# Patient Record
Sex: MALE
[De-identification: names, ages, dates, MRNs, and addresses within clinical notes are randomized per-mention and may not be internally consistent; named-entity substitution may affect disease eponyms.]

---

## 2018-01-01 PROBLEM — Z00.00 ENCOUNTER FOR PREVENTIVE HEALTH EXAMINATION: Status: ACTIVE | Noted: 2018-01-01

## 2018-01-31 ENCOUNTER — APPOINTMENT (OUTPATIENT)
Dept: UROLOGY | Facility: CLINIC | Age: 48
End: 2018-01-31

## 2021-03-01 ENCOUNTER — APPOINTMENT (OUTPATIENT)
Dept: SURGERY | Facility: CLINIC | Age: 51
End: 2021-03-01
Payer: COMMERCIAL

## 2021-03-01 VITALS
SYSTOLIC BLOOD PRESSURE: 134 MMHG | WEIGHT: 200 LBS | HEART RATE: 83 BPM | DIASTOLIC BLOOD PRESSURE: 82 MMHG | HEIGHT: 66 IN | TEMPERATURE: 97.4 F | BODY MASS INDEX: 32.14 KG/M2

## 2021-03-01 DIAGNOSIS — K57.32 DIVERTICULITIS OF LARGE INTESTINE W/OUT PERFORATION OR ABSCESS W/OUT BLEEDING: ICD-10-CM

## 2021-03-01 DIAGNOSIS — Z78.9 OTHER SPECIFIED HEALTH STATUS: ICD-10-CM

## 2021-03-01 DIAGNOSIS — Z83.3 FAMILY HISTORY OF DIABETES MELLITUS: ICD-10-CM

## 2021-03-01 DIAGNOSIS — F17.200 NICOTINE DEPENDENCE, UNSPECIFIED, UNCOMPLICATED: ICD-10-CM

## 2021-03-01 DIAGNOSIS — K64.9 UNSPECIFIED HEMORRHOIDS: ICD-10-CM

## 2021-03-01 DIAGNOSIS — L29.0 PRURITUS ANI: ICD-10-CM

## 2021-03-01 PROCEDURE — 46600 DIAGNOSTIC ANOSCOPY SPX: CPT

## 2021-03-01 PROCEDURE — 99204 OFFICE O/P NEW MOD 45 MIN: CPT | Mod: 25

## 2021-03-01 PROCEDURE — 99072 ADDL SUPL MATRL&STAF TM PHE: CPT

## 2021-03-07 PROBLEM — K57.32 DIVERTICULITIS, COLON: Status: ACTIVE | Noted: 2021-03-01

## 2021-03-07 PROBLEM — Z78.9 CAFFEINE USE: Status: ACTIVE | Noted: 2021-03-01

## 2021-03-07 PROBLEM — L29.0 PRURITUS ANI: Status: ACTIVE | Noted: 2021-03-07

## 2021-03-07 PROBLEM — Z83.3 FAMILY HISTORY OF TYPE 2 DIABETES MELLITUS: Status: ACTIVE | Noted: 2021-03-01

## 2021-03-07 PROBLEM — F17.200 CURRENT EVERY DAY SMOKER: Status: ACTIVE | Noted: 2021-03-01

## 2021-03-07 PROBLEM — K64.9 HEMORRHOID: Status: ACTIVE | Noted: 2021-03-01

## 2021-03-07 PROBLEM — Z78.9 SOCIAL ALCOHOL USE: Status: ACTIVE | Noted: 2021-03-01

## 2021-03-07 RX ORDER — LORATADINE 5 MG
TABLET,CHEWABLE ORAL
Refills: 0 | Status: ACTIVE | COMMUNITY

## 2021-03-07 NOTE — PROCEDURE
[FreeTextEntry1] : JAI and anoscopy show normal sphincter tone, normal internal hemorrhoids and no masses.\par

## 2021-03-07 NOTE — CONSULT LETTER
[Dear  ___] : Dear  [unfilled], [Consult Letter:] : I had the pleasure of evaluating your patient, [unfilled]. [Please see my note below.] : Please see my note below. [Consult Closing:] : Thank you very much for allowing me to participate in the care of this patient.  If you have any questions, please do not hesitate to contact me. [FreeTextEntry3] : Sincerely,\par \par Gelacio Rodriguez MD, Colon and Rectal Surgery\par \par [employer] [facility]\par [address] [place] [address]\par [facility], 3rd Floor\par [place], [place] [address]\par Tel [phone] ext [phone]\par Fax [phone]\par

## 2021-03-07 NOTE — HISTORY OF PRESENT ILLNESS
[FreeTextEntry1] : Patient is a 50M with PMH of nephrolithiasis, constipation, diverticulitis who presents for evaluation of perianal itching and burning.  Patient states that over the last year he has had itching and burning mostly after BM.  He currently has daily BM with Miralax.  Patient denies fevers, chills, nausea, vomiting, abdominal pain, constipation, diarrhea, blood in his stool or unexpected weight loss.  Patient denies a family history of colon cancer rectal cancer or inflammatory bowel disease.  Patient 's last colonoscopy was 3-4 years ago and showed diverticulosis.  We do not have those results.

## 2021-03-07 NOTE — ASSESSMENT
[FreeTextEntry1] : 50M with pruritus ani\par \par I discussed the pathology of pruritus ani with the patient.  I recommended cleaning with only water and a damp cloth, no soap or other topical agents on the area and calmoseptine as a barrier cream.  We will reassess the patient in 2 month.\par

## 2021-03-07 NOTE — PHYSICAL EXAM
[Abdomen Masses] : No abdominal masses [Abdomen Tenderness] : ~T No ~M abdominal tenderness [No HSM] : no hepatosplenomegaly [Excoriation] : no perianal excoriation [Fistula] : no fistulas [Wart] : no warts [Ulcer ___ cm] : no ulcers [Pilonidal Cyst] : no pilonidal cysts [Tender, Swollen] : nontender, non-swollen [Thrombosed] : that was not thrombosed [Skin Tags] : there were no residual hemorrhoidal skin tags seen [Normal] : was normal [None] : there was no rectal mass  [Respiratory Effort] : normal respiratory effort [Normal Rate and Rhythm] : normal rate and rhythm [de-identified] : external examination shows irritation of the perianal skin.  No other sugnificant abnormalities. [de-identified] : awake, alert and in no acute distress

## 2021-03-22 ENCOUNTER — APPOINTMENT (OUTPATIENT)
Dept: PULMONOLOGY | Facility: CLINIC | Age: 51
End: 2021-03-22
Payer: COMMERCIAL

## 2021-03-22 VITALS
RESPIRATION RATE: 14 BRPM | HEART RATE: 108 BPM | DIASTOLIC BLOOD PRESSURE: 80 MMHG | WEIGHT: 200 LBS | HEIGHT: 60 IN | BODY MASS INDEX: 39.27 KG/M2 | OXYGEN SATURATION: 97 % | SYSTOLIC BLOOD PRESSURE: 130 MMHG

## 2021-03-22 DIAGNOSIS — R91.1 SOLITARY PULMONARY NODULE: ICD-10-CM

## 2021-03-22 PROCEDURE — 99203 OFFICE O/P NEW LOW 30 MIN: CPT

## 2021-03-22 PROCEDURE — 99072 ADDL SUPL MATRL&STAF TM PHE: CPT

## 2021-03-22 NOTE — HISTORY OF PRESENT ILLNESS
[TextBox_4] : The patient was suffering from abdominal pains. He went to GI who ordered a CT of the abdomen.\par The lung cuts demonstrated a non de script 5mm nodule in the RLL.\par He is a smoker since the age of 15. He smokes about a pack a day.,\par He has good ADLS.

## 2021-03-22 NOTE — ASSESSMENT
[FreeTextEntry1] : The patient has a 5mm nodule. It is non de script.\par Given the smoking history we need to carefully monitor the nodule for at least 2 years.\par He will see me in early July and I will order a repeat CT.\par If the nodule grows he needs a PET scan and there should be a resection of the nodule.\par \par I discussed this in detail with the patient.

## 2021-05-03 ENCOUNTER — APPOINTMENT (OUTPATIENT)
Dept: SURGERY | Facility: CLINIC | Age: 51
End: 2021-05-03

## 2021-07-12 ENCOUNTER — APPOINTMENT (OUTPATIENT)
Dept: PULMONOLOGY | Facility: CLINIC | Age: 51
End: 2021-07-12

## 2021-09-20 ENCOUNTER — APPOINTMENT (OUTPATIENT)
Age: 51
End: 2021-09-20
Payer: COMMERCIAL

## 2021-09-20 VITALS
BODY MASS INDEX: 34.07 KG/M2 | WEIGHT: 212 LBS | DIASTOLIC BLOOD PRESSURE: 80 MMHG | HEIGHT: 66 IN | HEART RATE: 91 BPM | SYSTOLIC BLOOD PRESSURE: 122 MMHG | OXYGEN SATURATION: 98 % | RESPIRATION RATE: 12 BRPM

## 2021-09-20 DIAGNOSIS — R91.8 OTHER NONSPECIFIC ABNORMAL FINDING OF LUNG FIELD: ICD-10-CM

## 2021-09-20 DIAGNOSIS — J44.9 CHRONIC OBSTRUCTIVE PULMONARY DISEASE, UNSPECIFIED: ICD-10-CM

## 2021-09-20 PROCEDURE — 99213 OFFICE O/P EST LOW 20 MIN: CPT

## 2021-09-20 NOTE — ASSESSMENT
[FreeTextEntry1] : A:\par COPD  \par multiple nodules likely inflammatory in nature\par \par plan:\par Stress compliance with inhalers. Renewed today.\par cont PRN albuterol\par CT chest yearly\par D/C smoking was discussed with the patient\par F/U 12 months\par

## 2021-09-20 NOTE — HISTORY OF PRESENT ILLNESS
[Doing Well] : doing well [Fever] : no fever [None] : None [TextBox_4] : I reviewed my original evaluation  and last notes.\par I reviewed and interpreted any  OP CXR/CT available on the:\par [facility]ional site via computer access.\par I discussed the results with the patient.\par

## 2022-09-19 ENCOUNTER — APPOINTMENT (OUTPATIENT)
Age: 52
End: 2022-09-19